# Patient Record
Sex: FEMALE | Race: WHITE | NOT HISPANIC OR LATINO
[De-identification: names, ages, dates, MRNs, and addresses within clinical notes are randomized per-mention and may not be internally consistent; named-entity substitution may affect disease eponyms.]

---
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
## 2022-01-06 ENCOUNTER — APPOINTMENT (OUTPATIENT)
Dept: OBGYN | Facility: CLINIC | Age: 22
End: 2022-01-06
Payer: COMMERCIAL

## 2022-01-06 VITALS
HEIGHT: 61 IN | WEIGHT: 124 LBS | BODY MASS INDEX: 23.41 KG/M2 | SYSTOLIC BLOOD PRESSURE: 130 MMHG | DIASTOLIC BLOOD PRESSURE: 76 MMHG

## 2022-01-06 PROCEDURE — 99395 PREV VISIT EST AGE 18-39: CPT

## 2022-01-06 RX ORDER — NORGESTIMATE AND ETHINYL ESTRADIOL 7DAYSX3 LO
0.18/0.215/0.25 KIT ORAL
Qty: 3 | Refills: 3 | Status: COMPLETED | COMMUNITY
Start: 2020-12-03 | End: 2022-01-06

## 2022-01-06 NOTE — PHYSICAL EXAM
[Examination Of The Breasts] : a normal appearance [Normal] : normal [No Masses] : no breast masses were palpable [FreeTextEntry6] : hyperkeratosis right nipple [FreeTextEntry1] : pt declined

## 2022-01-06 NOTE — PLAN
[FreeTextEntry1] : agree with derm diagnosis, does not appear to be melasma.  \par discussed BC for heavy menses, pt would like to try OCP

## 2022-01-06 NOTE — HISTORY OF PRESENT ILLNESS
[Never active] : never active [TextBox_4] : Pt states she has dark discoloring on her right nipple - went to derm and told her didn’t see anything wrong and was told to come here.  Started about a year ago and is getting darker.  Is not on birth control, has never been pregnant.  Also c/o heavy menses - always, wears pad and changes about q 1 hour - lasts 7- days, denies dizziness or lightheadedness.\par Pt states derm diagnosed her with hyperkeratosis of the nipple, wanted a second opinion

## 2022-01-06 NOTE — HISTORY OF PRESENT ILLNESS
[TextBox_4] : Pt states she needs to switch OCP - last one made her periods last longer, heavier, and cramps worse.  \par Pt is sexually active with male partners, uses condoms. \par Pt states she gets utis once a month - had one yesterday - hasn’t taken any medication.  feels ok today.  had been treated in october and didn’t complete the abx because it was making her sick

## 2022-01-11 DIAGNOSIS — N39.0 URINARY TRACT INFECTION, SITE NOT SPECIFIED: ICD-10-CM

## 2022-01-11 LAB — BACTERIA UR CULT: ABNORMAL

## 2022-01-12 ENCOUNTER — NON-APPOINTMENT (OUTPATIENT)
Age: 22
End: 2022-01-12

## 2022-01-27 ENCOUNTER — NON-APPOINTMENT (OUTPATIENT)
Age: 22
End: 2022-01-27

## 2022-02-07 ENCOUNTER — RX RENEWAL (OUTPATIENT)
Age: 22
End: 2022-02-07

## 2022-02-20 ENCOUNTER — TRANSCRIPTION ENCOUNTER (OUTPATIENT)
Age: 22
End: 2022-02-20

## 2022-12-21 ENCOUNTER — NON-APPOINTMENT (OUTPATIENT)
Age: 22
End: 2022-12-21

## 2023-01-09 ENCOUNTER — APPOINTMENT (OUTPATIENT)
Dept: OBGYN | Facility: CLINIC | Age: 23
End: 2023-01-09
Payer: COMMERCIAL

## 2023-01-09 ENCOUNTER — NON-APPOINTMENT (OUTPATIENT)
Age: 23
End: 2023-01-09

## 2023-01-09 VITALS
BODY MASS INDEX: 22.09 KG/M2 | WEIGHT: 117 LBS | SYSTOLIC BLOOD PRESSURE: 138 MMHG | HEIGHT: 61 IN | DIASTOLIC BLOOD PRESSURE: 83 MMHG

## 2023-01-09 LAB — HCG UR QL: NEGATIVE

## 2023-01-09 PROCEDURE — 81025 URINE PREGNANCY TEST: CPT

## 2023-01-09 PROCEDURE — 99395 PREV VISIT EST AGE 18-39: CPT

## 2023-01-09 RX ORDER — ELUXADOLINE 75 MG/1
TABLET, FILM COATED ORAL
Refills: 0 | Status: ACTIVE | COMMUNITY

## 2023-01-09 NOTE — HISTORY OF PRESENT ILLNESS
[Currently Active] : currently active [Men] : men [Vaginal] : vaginal [No] : No [Patient refuses STI testing] : Patient refuses STI testing [FreeTextEntry1] : 23 y/o G 0 female, LMP: 2/23/2022, presents for annual GYN visit.  c/o BTB on OCP: Aviane (not every cycle). Switched to Aviane 1 yr ago bc last ocp made her menses last longer, and cramps worse.  Was doing okay on Aviane for the first 5 months but now back to longer and painful menses and had BTB 4 episodes.  Confirms taking OCP daily and around same time. \par .\par Sexual Activity: monogamous with BF.\par Contraception: OCP\par Social/Mental Health: Denies ETOH, denies tobacco or any illicit drug use.  Denies depression, anxiety, thoughts of personal harm or suicidal ideation.\par \par ROS:  Denies fever/chills, HA, Cough/sore throat, CP, SOB, N/V, Diarrhea/Constipation, Pelvic pain,\par Urinary frequency/urgency/incontinence, irregular vaginal bleeding, discharge or irritation.  \par \par Medical History\par GYN HX: denies\par PMH: IBS\par PSH: denies\par Meds:OCP, viberzi\par Allergies: PCN, sulfa\par

## 2023-01-09 NOTE — DISCUSSION/SUMMARY
[FreeTextEntry1] : gc/ct and BV panel ordered.\par UCG neg\par discussed changing ocp to higher dose to help w BTB.\par Patient denies DVT, PE, HTN, DM, overall healthy-OCP refilled. \par RTO in 1 year or sooner for any gyn issues.\par Patient verbalized understanding.\par

## 2023-01-10 LAB
CANDIDA VAG CYTO: NOT DETECTED
G VAGINALIS+PREV SP MTYP VAG QL MICRO: NOT DETECTED
T VAGINALIS VAG QL WET PREP: NOT DETECTED

## 2023-01-13 LAB
C TRACH RRNA SPEC QL NAA+PROBE: NOT DETECTED
N GONORRHOEA RRNA SPEC QL NAA+PROBE: NOT DETECTED
SOURCE AMPLIFICATION: NORMAL

## 2023-01-20 ENCOUNTER — RX RENEWAL (OUTPATIENT)
Age: 23
End: 2023-01-20

## 2023-01-24 ENCOUNTER — NON-APPOINTMENT (OUTPATIENT)
Age: 23
End: 2023-01-24

## 2023-01-31 ENCOUNTER — NON-APPOINTMENT (OUTPATIENT)
Age: 23
End: 2023-01-31

## 2023-02-02 ENCOUNTER — APPOINTMENT (OUTPATIENT)
Dept: OBGYN | Facility: CLINIC | Age: 23
End: 2023-02-02
Payer: COMMERCIAL

## 2023-02-02 ENCOUNTER — APPOINTMENT (OUTPATIENT)
Dept: OBGYN | Facility: CLINIC | Age: 23
End: 2023-02-02

## 2023-02-02 PROCEDURE — 99213 OFFICE O/P EST LOW 20 MIN: CPT | Mod: 95

## 2023-02-02 RX ORDER — NITROFURANTOIN (MONOHYDRATE/MACROCRYSTALS) 25; 75 MG/1; MG/1
100 CAPSULE ORAL TWICE DAILY
Qty: 10 | Refills: 0 | Status: COMPLETED | COMMUNITY
Start: 2022-01-11 | End: 2023-02-02

## 2023-02-02 RX ORDER — LEVONORGESTREL AND ETHINYL ESTRADIOL 0.15-0.03
0.15-3 KIT ORAL DAILY
Qty: 3 | Refills: 3 | Status: COMPLETED | COMMUNITY
Start: 2022-01-06 | End: 2023-02-02

## 2023-02-02 RX ORDER — DROSPIRENONE AND ETHINYL ESTRADIOL 0.02-3(28)
3-0.02 KIT ORAL DAILY
Qty: 1 | Refills: 0 | Status: COMPLETED | COMMUNITY
Start: 2022-12-22 | End: 2023-02-02

## 2023-02-02 RX ORDER — DROSPIRENONE AND ETHINYL ESTRADIOL 0.02-3(28)
3-0.02 KIT ORAL DAILY
Qty: 1 | Refills: 0 | Status: COMPLETED | COMMUNITY
Start: 2022-01-12 | End: 2023-02-02

## 2023-02-02 NOTE — HISTORY OF PRESENT ILLNESS
[Other Location: e.g. School (Enter Location, City,State)___] : at [unfilled], at the time of the visit. [Medical Office: ([facility])___] : at the medical office located in  [Verbal consent obtained from patient] : the patient, [unfilled] [TextBox_4] : Pt states she saw NP and switched OCP to higher dose to help with her symptoms. GOt nervous to take it so she skipped a week.  Then started it and felt dizzy, had headaches, felt weird.  Stopped taking it but wants to be on a pill just doesn’t know if she should be a high dose.  Was on vestura and was find for awhile but then she was getting very sick before her period, stomach virus, felt dizzy - PMS type symptoms, was crying all the time.

## 2023-03-29 ENCOUNTER — TRANSCRIPTION ENCOUNTER (OUTPATIENT)
Age: 23
End: 2023-03-29

## 2023-03-29 ENCOUNTER — NON-APPOINTMENT (OUTPATIENT)
Age: 23
End: 2023-03-29

## 2023-05-05 ENCOUNTER — NON-APPOINTMENT (OUTPATIENT)
Age: 23
End: 2023-05-05

## 2023-06-23 ENCOUNTER — APPOINTMENT (OUTPATIENT)
Dept: OBGYN | Facility: CLINIC | Age: 23
End: 2023-06-23
Payer: COMMERCIAL

## 2023-06-23 VITALS
HEART RATE: 94 BPM | HEIGHT: 61 IN | BODY MASS INDEX: 21.45 KG/M2 | SYSTOLIC BLOOD PRESSURE: 133 MMHG | WEIGHT: 113.6 LBS | DIASTOLIC BLOOD PRESSURE: 78 MMHG

## 2023-06-23 DIAGNOSIS — N93.9 ABNORMAL UTERINE AND VAGINAL BLEEDING, UNSPECIFIED: ICD-10-CM

## 2023-06-23 LAB
HCG UR QL: NEGATIVE
QUALITY CONTROL: YES

## 2023-06-23 PROCEDURE — 81025 URINE PREGNANCY TEST: CPT

## 2023-06-23 PROCEDURE — 99212 OFFICE O/P EST SF 10 MIN: CPT

## 2023-06-23 NOTE — DISCUSSION/SUMMARY
[FreeTextEntry1] : bv panel and gc/ct ordered\par ucg neg\par discussed contraception, including LARCs and COCs.\par pt requesting to try last ocp (triphasic), reviewed OCP R/B/A and advised to stop immediately if develops any cp, sob, leg pain, and headaches.\par Patient denies DVT, PE, HTN, DM, overall healthy-OCP refilled. \par RTO for any gyn issues.\par Patient verbalized understanding.\par

## 2023-06-23 NOTE — PHYSICAL EXAM
[Chaperone Declined] : Patient declined chaperone [Appropriately responsive] : appropriately responsive [Alert] : alert [No Acute Distress] : no acute distress [Soft] : soft [Non-tender] : non-tender [Non-distended] : non-distended [No Lesions] : no lesions [No Mass] : no mass [Oriented x3] : oriented x3 [Labia Majora] : normal [Labia Minora] : normal [Discharge] : a  ~M vaginal discharge was present [Moderate] : moderate [[place]] : yellow [Cheesy] : cheesy [Normal] : normal [Uterine Adnexae] : normal

## 2023-06-23 NOTE — HISTORY OF PRESENT ILLNESS
[FreeTextEntry1] : 24 y/o G0 female, LMP: 5/31/23 presents for a f/u visit to discuss OCP.  Last had telehealth, 4 mths ago, w Dr Krishna and OCP was switch to triphasic to help w BTB on OCP.   Took triphasic for 2 months but stopped bc had sob, dizziness, ha, nausea, lightheaded, chest pain, heart palpitations, neck pain, throat sensations and leg pain.  f/u w PMD and was told all wnl, leg dopplers neg, and was told most likely viral infection.  Now menses are  back to being irreg and heavy/painful menses.  Also c/o spotting in bw menses.  Requesting to go back on OCP.\par \par ROS:  Denies fever/chills, HA, Cough/sore throat, CP, SOB, N/V, Diarrhea/Constipation, Pelvic pain, dysuria, urinary urgency and burning, irregular vaginal bleeding, discharge or irritation.\par  [No] : Patient does not have concerns regarding sex [Currently Active] : currently active [Men] : men [Patient refuses STI testing] : Patient refuses STI testing

## 2023-06-27 LAB
C TRACH RRNA SPEC QL NAA+PROBE: NOT DETECTED
CANDIDA VAG CYTO: NOT DETECTED
G VAGINALIS+PREV SP MTYP VAG QL MICRO: NOT DETECTED
N GONORRHOEA RRNA SPEC QL NAA+PROBE: NOT DETECTED
SOURCE AMPLIFICATION: NORMAL
T VAGINALIS VAG QL WET PREP: NOT DETECTED

## 2023-06-28 ENCOUNTER — NON-APPOINTMENT (OUTPATIENT)
Age: 23
End: 2023-06-28

## 2023-06-30 ENCOUNTER — TRANSCRIPTION ENCOUNTER (OUTPATIENT)
Age: 23
End: 2023-06-30

## 2023-07-13 ENCOUNTER — TRANSCRIPTION ENCOUNTER (OUTPATIENT)
Age: 23
End: 2023-07-13

## 2023-07-18 ENCOUNTER — TRANSCRIPTION ENCOUNTER (OUTPATIENT)
Age: 23
End: 2023-07-18

## 2023-07-31 ENCOUNTER — APPOINTMENT (OUTPATIENT)
Dept: OBGYN | Facility: CLINIC | Age: 23
End: 2023-07-31
Payer: COMMERCIAL

## 2023-07-31 ENCOUNTER — APPOINTMENT (OUTPATIENT)
Dept: OBGYN | Facility: CLINIC | Age: 23
End: 2023-07-31

## 2023-07-31 VITALS
SYSTOLIC BLOOD PRESSURE: 121 MMHG | HEIGHT: 61 IN | WEIGHT: 112 LBS | BODY MASS INDEX: 21.14 KG/M2 | DIASTOLIC BLOOD PRESSURE: 72 MMHG

## 2023-07-31 DIAGNOSIS — Z30.09 ENCOUNTER FOR OTHER GENERAL COUNSELING AND ADVICE ON CONTRACEPTION: ICD-10-CM

## 2023-07-31 PROCEDURE — 99213 OFFICE O/P EST LOW 20 MIN: CPT

## 2023-07-31 NOTE — HISTORY OF PRESENT ILLNESS
[FreeTextEntry1] : Patient is here to discuss about birth control pills, Patient stopped taking the pill because of the side effects, Patient has tried 4 different types of pills in the past. However patient decides to go back on the pill.

## 2023-07-31 NOTE — PLAN
[FreeTextEntry1] : Patient will be started on Junel, IUD was discussed with the patient in detail however patient does not want the IUD at this time

## 2023-08-13 ENCOUNTER — NON-APPOINTMENT (OUTPATIENT)
Age: 23
End: 2023-08-13

## 2023-08-15 ENCOUNTER — NON-APPOINTMENT (OUTPATIENT)
Age: 23
End: 2023-08-15

## 2023-08-15 ENCOUNTER — TRANSCRIPTION ENCOUNTER (OUTPATIENT)
Age: 23
End: 2023-08-15

## 2023-08-15 ENCOUNTER — APPOINTMENT (OUTPATIENT)
Dept: OBGYN | Facility: CLINIC | Age: 23
End: 2023-08-15

## 2023-10-23 ENCOUNTER — NON-APPOINTMENT (OUTPATIENT)
Age: 23
End: 2023-10-23

## 2024-01-11 ENCOUNTER — APPOINTMENT (OUTPATIENT)
Dept: OBGYN | Facility: CLINIC | Age: 24
End: 2024-01-11
Payer: COMMERCIAL

## 2024-01-11 ENCOUNTER — ASOB RESULT (OUTPATIENT)
Age: 24
End: 2024-01-11

## 2024-01-11 VITALS
BODY MASS INDEX: 20.77 KG/M2 | SYSTOLIC BLOOD PRESSURE: 119 MMHG | WEIGHT: 110 LBS | DIASTOLIC BLOOD PRESSURE: 83 MMHG | HEIGHT: 61 IN

## 2024-01-11 DIAGNOSIS — Z01.419 ENCOUNTER FOR GYNECOLOGICAL EXAMINATION (GENERAL) (ROUTINE) W/OUT ABNORMAL FINDINGS: ICD-10-CM

## 2024-01-11 PROCEDURE — 99395 PREV VISIT EST AGE 18-39: CPT

## 2024-01-11 PROCEDURE — 76830 TRANSVAGINAL US NON-OB: CPT

## 2024-01-11 RX ORDER — NORETHINDRONE ACETATE AND ETHINYL ESTRADIOL AND FERROUS FUMARATE 1MG-20(21)
1-20 KIT ORAL
Qty: 90 | Refills: 3 | Status: COMPLETED | COMMUNITY
Start: 2023-08-16 | End: 2024-01-11

## 2024-01-11 RX ORDER — NORGESTIMATE AND ETHINYL ESTRADIOL 7DAYSX3 LO
0.18/0.215/0.25 KIT ORAL
Qty: 3 | Refills: 3 | Status: COMPLETED | COMMUNITY
Start: 2023-02-02 | End: 2024-01-11

## 2024-01-11 RX ORDER — FLUCONAZOLE 150 MG/1
150 TABLET ORAL
Qty: 2 | Refills: 3 | Status: COMPLETED | COMMUNITY
Start: 2023-08-15 | End: 2024-01-11

## 2024-01-11 RX ORDER — NORETHINDRONE ACETATE AND ETHINYL ESTRADIOL AND FERROUS FUMARATE 1.5-30(21)
1.5-3 KIT ORAL
Qty: 3 | Refills: 3 | Status: ACTIVE | COMMUNITY
Start: 2023-07-31 | End: 1900-01-01

## 2024-01-11 NOTE — PHYSICAL EXAM
[Chaperone Present] : A chaperone was present in the examining room during all aspects of the physical examination [Appropriately responsive] : appropriately responsive [Alert] : alert [No Acute Distress] : no acute distress [Soft] : soft [Non-tender] : non-tender [Non-distended] : non-distended [Oriented x3] : oriented x3 [Examination Of The Breasts] : a normal appearance [No Masses] : no breast masses were palpable [Labia Majora] : normal [Labia Minora] : normal [Normal] : normal [Uterine Adnexae] : normal [___] : [unfilled] ~Ucm mass on the right [FreeTextEntry6] : pt c/o pain in adenxa for past few weeks and after menses

## 2024-01-11 NOTE — HISTORY OF PRESENT ILLNESS
[TextBox_4] : 24yo presents for annual exam  no complaints on OCP - is happy sexually active with male partners - inconsistent condom use

## 2024-01-18 LAB — CYTOLOGY CVX/VAG DOC THIN PREP: NORMAL

## 2025-01-13 ENCOUNTER — RX RENEWAL (OUTPATIENT)
Age: 25
End: 2025-01-13

## 2025-01-16 ENCOUNTER — APPOINTMENT (OUTPATIENT)
Dept: OBGYN | Facility: CLINIC | Age: 25
End: 2025-01-16
Payer: COMMERCIAL

## 2025-01-16 VITALS
BODY MASS INDEX: 20.77 KG/M2 | HEIGHT: 61 IN | SYSTOLIC BLOOD PRESSURE: 122 MMHG | DIASTOLIC BLOOD PRESSURE: 78 MMHG | WEIGHT: 110 LBS

## 2025-01-16 DIAGNOSIS — Z01.419 ENCOUNTER FOR GYNECOLOGICAL EXAMINATION (GENERAL) (ROUTINE) W/OUT ABNORMAL FINDINGS: ICD-10-CM

## 2025-01-16 DIAGNOSIS — Z11.3 ENCOUNTER FOR SCREENING FOR INFECTIONS WITH A PREDOMINANTLY SEXUAL MODE OF TRANSMISSION: ICD-10-CM

## 2025-01-16 DIAGNOSIS — R92.30 DENSE BREASTS, UNSPECIFIED: ICD-10-CM

## 2025-01-16 PROCEDURE — 99395 PREV VISIT EST AGE 18-39: CPT

## 2025-01-16 PROCEDURE — 36415 COLL VENOUS BLD VENIPUNCTURE: CPT

## 2025-01-16 RX ORDER — DROSPIRENONE, ETHINYL ESTRADIOL AND LEVOMEFOLATE CALCIUM AND LEVOMEFOLATE CALCIUM 3-0.02(24)
3-0.02-0.451 KIT ORAL DAILY
Qty: 90 | Refills: 3 | Status: ACTIVE | COMMUNITY
Start: 2025-01-16 | End: 1900-01-01

## 2025-01-18 LAB
C TRACH RRNA SPEC QL NAA+PROBE: NOT DETECTED
HBV SURFACE AG SER QL: NONREACTIVE
HCV AB SER QL: NONREACTIVE
HCV S/CO RATIO: 0.19 S/CO
HIV1+2 AB SPEC QL IA.RAPID: NONREACTIVE
N GONORRHOEA RRNA SPEC QL NAA+PROBE: NOT DETECTED
SOURCE AMPLIFICATION: NORMAL
SOURCE AMPLIFICATION: NORMAL
T PALLIDUM AB SER QL IA: NEGATIVE
T VAGINALIS RRNA SPEC QL NAA+PROBE: NOT DETECTED

## 2025-01-23 LAB — CYTOLOGY CVX/VAG DOC THIN PREP: NORMAL

## 2025-02-19 ENCOUNTER — TRANSCRIPTION ENCOUNTER (OUTPATIENT)
Age: 25
End: 2025-02-19

## 2025-02-19 RX ORDER — DROSPIRENONE AND ETHINYL ESTRADIOL 0.02-3(28)
3-0.02 KIT ORAL
Qty: 3 | Refills: 0 | Status: ACTIVE | COMMUNITY
Start: 2025-02-19 | End: 1900-01-01

## 2025-04-03 ENCOUNTER — TRANSCRIPTION ENCOUNTER (OUTPATIENT)
Age: 25
End: 2025-04-03

## 2025-05-13 ENCOUNTER — TRANSCRIPTION ENCOUNTER (OUTPATIENT)
Age: 25
End: 2025-05-13

## 2025-08-04 ENCOUNTER — TRANSCRIPTION ENCOUNTER (OUTPATIENT)
Age: 25
End: 2025-08-04

## 2025-09-17 ENCOUNTER — TRANSCRIPTION ENCOUNTER (OUTPATIENT)
Age: 25
End: 2025-09-17